# Patient Record
Sex: MALE | Race: WHITE | NOT HISPANIC OR LATINO | Employment: OTHER | URBAN - METROPOLITAN AREA
[De-identification: names, ages, dates, MRNs, and addresses within clinical notes are randomized per-mention and may not be internally consistent; named-entity substitution may affect disease eponyms.]

---

## 2018-03-28 RX ORDER — ATORVASTATIN CALCIUM 10 MG/1
10 TABLET, FILM COATED ORAL DAILY
COMMUNITY
End: 2018-11-15

## 2018-03-28 RX ORDER — ASPIRIN 81 MG/1
81 TABLET ORAL DAILY
COMMUNITY
End: 2018-11-15

## 2018-03-28 RX ORDER — ATORVASTATIN CALCIUM 40 MG/1
40 TABLET, FILM COATED ORAL DAILY
COMMUNITY
End: 2019-09-09

## 2018-03-29 ENCOUNTER — OFFICE VISIT (OUTPATIENT)
Dept: NEUROSURGERY | Facility: CLINIC | Age: 68
End: 2018-03-29

## 2018-03-29 VITALS
WEIGHT: 205 LBS | HEIGHT: 68 IN | TEMPERATURE: 98 F | BODY MASS INDEX: 31.07 KG/M2 | DIASTOLIC BLOOD PRESSURE: 68 MMHG | SYSTOLIC BLOOD PRESSURE: 110 MMHG

## 2018-03-29 DIAGNOSIS — M48.02 SPINAL STENOSIS IN CERVICAL REGION: ICD-10-CM

## 2018-03-29 DIAGNOSIS — M62.59 ATROPHY OF MUSCLE OF MULTIPLE SITES: Primary | ICD-10-CM

## 2018-03-29 DIAGNOSIS — M47.812 CERVICAL SPONDYLOSIS WITHOUT MYELOPATHY: ICD-10-CM

## 2018-03-29 PROCEDURE — 99204 OFFICE O/P NEW MOD 45 MIN: CPT | Performed by: NEUROLOGICAL SURGERY

## 2018-03-29 RX ORDER — LOSARTAN POTASSIUM AND HYDROCHLOROTHIAZIDE 12.5; 5 MG/1; MG/1
1 TABLET ORAL DAILY
Refills: 5 | COMMUNITY
Start: 2018-03-05
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
## 2018-04-16 ENCOUNTER — OFFICE VISIT (OUTPATIENT)
Dept: NEUROSURGERY | Facility: CLINIC | Age: 68
End: 2018-04-16

## 2018-04-16 VITALS
WEIGHT: 199 LBS | OXYGEN SATURATION: 93 % | HEIGHT: 68 IN | SYSTOLIC BLOOD PRESSURE: 130 MMHG | HEART RATE: 66 BPM | TEMPERATURE: 97.3 F | BODY MASS INDEX: 30.16 KG/M2 | DIASTOLIC BLOOD PRESSURE: 60 MMHG

## 2018-04-16 DIAGNOSIS — M62.59 ATROPHY OF MUSCLE OF MULTIPLE SITES: Primary | ICD-10-CM

## 2018-04-16 DIAGNOSIS — M47.812 CERVICAL SPONDYLOSIS WITHOUT MYELOPATHY: ICD-10-CM

## 2018-04-16 DIAGNOSIS — M48.02 SPINAL STENOSIS IN CERVICAL REGION: ICD-10-CM

## 2018-04-16 PROCEDURE — 99213 OFFICE O/P EST LOW 20 MIN: CPT | Performed by: NEUROLOGICAL SURGERY

## 2018-04-16 NOTE — PROGRESS NOTES
Patient: Yosvany Ojeda  :  1950  Chart #:  [id number]    Date of Service: 18    Chief Complaint:  Hand atrophy    Neck Pain    Chronicity: Mr. Ojeda returns today for a follow-up on his neck pain. Episode onset: Patient states that it is not so much pain as it is stiffness.  The stiffness is right sided. The problem occurs intermittently. The problem has been gradually improving (He doesn't have much in the way of arm of hand pain.). The pain is associated with an unknown factor. Pain location: tightness in neck more on left. The quality of the pain is described as aching (occasional neck pain;  no arm pain). The pain is at a severity of 4/10. The pain is mild (He is concerned about the muscle tone in his right upper extremity.). The symptoms are aggravated by position. Stiffness is present in the morning and all day. Associated symptoms include weakness. Pertinent negatives include no numbness or tingling. Associated symptoms comments: No numbness in hands;  Arms and hands atrophied.  . He has tried nothing for the symptoms. The treatment provided no relief.     There are no other new symptoms or complaints since visit on 3/29/18.    Radiographic Images:   EMG/NCV dated 01/15/18 shows he has denervation changes in the C7 and C8 innervated muscles bilaterally.  NCV study of the arms was normal.       MRI C-spine dated shows straightening of the cervical curvature, desiccation of all the discs, general narrowing in the spinal canal and mild compression of the spinal cord at C4/5 with a mild signal change at that level.    Past Medical History:   Diagnosis Date   • Chronic headaches    • Gait disturbance    • Sleep apnea      Current Outpatient Prescriptions   Medication Sig Dispense Refill   • aspirin 81 MG EC tablet Take 81 mg by mouth Daily.     • atorvastatin (LIPITOR) 10 MG tablet Take 10 mg by mouth Daily.     • atorvastatin (LIPITOR) 40 MG tablet Take 40 mg by mouth Daily.     •  "losartan-hydrochlorothiazide (HYZAAR) 50-12.5 MG per tablet Take 1 tablet by mouth Daily.  5     No current facility-administered medications for this visit.       Allergies   Allergen Reactions   • Shellfish-Derived Products Hives     Social History     Social History   • Marital status:      Social History Main Topics   • Smoking status: Current Every Day Smoker   • Smokeless tobacco: Never Used   • Alcohol use Yes   • Drug use: Unknown   • Sexual activity: Defer     Other Topics Concern   • Not on file     Family History   Problem Relation Age of Onset   • Parkinsonism Father    • Diabetes Maternal Grandfather      History reviewed. No pertinent surgical history.  Review of Systems   Musculoskeletal: Positive for arthralgias, gait problem, myalgias, neck pain and neck stiffness.   Neurological: Positive for weakness. Negative for tingling and numbness.   Psychiatric/Behavioral: Positive for agitation.   All other systems reviewed and are negative.    Vitals:    04/16/18 0910   BP: 130/60   BP Location: Left arm   Patient Position: Sitting   Pulse: 66   Temp: 97.3 °F (36.3 °C)   TempSrc: Temporal Artery    SpO2: 93%   Weight: 90.3 kg (199 lb)   Height: 172.7 cm (67.99\")     Physical Exam  Neurologic Exam  Constitutional: The patient is oriented to person, place, and time.  The patient appears well-developed and well-nourished and in no distress.   Neat elderly  o/w healthy male   Neck: Trachea normal and diminished range of motion in all directions. No thyroid mass present. No Spurling's or L'Hermitte's signs  Shoulder ROM limited to 150 deg abduction.  Cardiovascular: Regular rhythm   Pulses:  Carotid pulses are 2+ on the right side, and 2+ on the left side.  Radial pulses are 2+ on the right side, and 2+ on the left side.   Dorsalis pedis pulses are 2+ on the right side, and 2+ on the left side.   Pulmonary/Chest: Effort normal    Musculoskeletal:   Lumbar back: The patient exhibits no pain with movement. " The patient exhibits normal range of motion, no deformity and no spasm.   Mild stiffness; SLR negative; Hip ROM normal        Neurologic Exam      Mental Status   Oriented to person, place, and time.   Attention: normal. Concentration: normal.   Speech: speech is normal   Level of consciousness: alert  Knowledge: good and consistent with education.   Normal comprehension.      Cranial Nerves   Cranial nerves II through XII intact.      Motor Exam   Muscle bulk: normal except atrophy in both arms and hands;  Severe atrophy noted in both hands;  Left arm circumference is 2.5 cm smaller than R.  Overall muscle tone: normal  No Pronator Drift     Strength   Strength 5/5 throughout except 4/5 intrinsic hand muscles and 4+/5 arm muscles.     Sensory Exam   Light touch normal.   Proprioception normal.      Gait, Coordination, and Reflexes      Gait: normal with out spasticity     Tremor   Resting tremor: absent  Intention tremor: absent  Action tremor: absent     Reflexes   Right biceps: 1+  Left biceps: 1+  Right triceps: 1+  Left triceps: 1+  Right patellar: 1+  Left patellar: 1+  Right achilles: 0+  Left achilles: 1+  No Babinski signs  Right Pro: absent  Left Pro: absent  Right ankle clonus: absent  Left ankle clonus: absent  FINN normal; R handed      Diagnoses and all orders for this visit:    Atrophy of muscle of multiple sites  -     Case Request Cath Lab: [facility]    Cervical spondylosis without myelopathy  -     Case Request Cath Lab: [facility]    Spinal stenosis in cervical region  -     Case Request Cath Lab: [facility]      Plan:  I recommend a cervical myelogram with CT to follow;  I will see him again after the myelogram and make further treatment recommendations.  I have discussed this with the patient.    I, Dr. Dionisio Hopson, personally performed the services described in the documentation as scribed in my presence, and the documentation is both accurate and complete.    Dionisio ALBARRAN  MD Mark Atnhony

## 2018-04-24 PROBLEM — M48.02 SPINAL STENOSIS IN CERVICAL REGION: Status: ACTIVE | Noted: 2018-04-24

## 2018-04-24 PROBLEM — M47.812 CERVICAL SPONDYLOSIS WITHOUT MYELOPATHY: Status: ACTIVE | Noted: 2018-04-24

## 2018-04-24 PROBLEM — M62.59 ATROPHY OF MUSCLE OF MULTIPLE SITES: Status: ACTIVE | Noted: 2018-04-24

## 2018-04-27 ENCOUNTER — APPOINTMENT (OUTPATIENT)
Dept: CT IMAGING | Facility: HOSPITAL | Age: 68
End: 2018-04-27

## 2018-04-27 ENCOUNTER — HOSPITAL ENCOUNTER (OUTPATIENT)
Facility: HOSPITAL | Age: 68
Discharge: HOME | End: 2018-04-27
Attending: RADIOLOGY | Admitting: RADIOLOGY

## 2018-04-27 VITALS
OXYGEN SATURATION: 96 % | RESPIRATION RATE: 16 BRPM | HEART RATE: 60 BPM | DIASTOLIC BLOOD PRESSURE: 81 MMHG | BODY MASS INDEX: 29.7 KG/M2 | WEIGHT: 195.99 LBS | TEMPERATURE: 97.7 F | SYSTOLIC BLOOD PRESSURE: 122 MMHG | HEIGHT: 68 IN

## 2018-04-27 DIAGNOSIS — M48.02 SPINAL STENOSIS IN CERVICAL REGION: ICD-10-CM

## 2018-04-27 DIAGNOSIS — M47.812 CERVICAL SPONDYLOSIS WITHOUT MYELOPATHY: ICD-10-CM

## 2018-04-27 DIAGNOSIS — M62.59 ATROPHY OF MUSCLE OF MULTIPLE SITES: ICD-10-CM

## 2018-04-27 PROCEDURE — 62302 MYELOGRAPHY LUMBAR INJECTION: CPT | Performed by: RADIOLOGY

## 2018-04-27 PROCEDURE — 25010000002 IOPAMIDOL 61 % SOLUTION: Performed by: RADIOLOGY

## 2018-04-27 PROCEDURE — 72126 CT NECK SPINE W/DYE: CPT

## 2018-04-27 RX ORDER — LIDOCAINE HYDROCHLORIDE 10 MG/ML
INJECTION, SOLUTION EPIDURAL; INFILTRATION; INTRACAUDAL; PERINEURAL AS NEEDED
Status: DISCONTINUED | OUTPATIENT
Start: 2018-04-27 | End: 2018-04-27 | Stop reason: HOSPADM

## 2018-05-31 ENCOUNTER — OFFICE VISIT (OUTPATIENT)
Dept: NEUROSURGERY | Facility: CLINIC | Age: 68
End: 2018-05-31

## 2018-05-31 VITALS
WEIGHT: 198.6 LBS | SYSTOLIC BLOOD PRESSURE: 124 MMHG | DIASTOLIC BLOOD PRESSURE: 78 MMHG | BODY MASS INDEX: 30.1 KG/M2 | OXYGEN SATURATION: 95 % | HEART RATE: 60 BPM | HEIGHT: 68 IN | TEMPERATURE: 98 F

## 2018-05-31 DIAGNOSIS — M62.59 ATROPHY OF MUSCLE OF MULTIPLE SITES: Primary | ICD-10-CM

## 2018-05-31 DIAGNOSIS — M48.02 SPINAL STENOSIS IN CERVICAL REGION: ICD-10-CM

## 2018-05-31 DIAGNOSIS — M47.812 CERVICAL SPONDYLOSIS WITHOUT MYELOPATHY: ICD-10-CM

## 2018-05-31 PROCEDURE — 99213 OFFICE O/P EST LOW 20 MIN: CPT | Performed by: NEUROLOGICAL SURGERY

## 2018-05-31 NOTE — PROGRESS NOTES
Patient: Yosvany Ojeda  :  1950  Chart #:  [id number]    Date of Service: 18    Chief Complaint:   Chief Complaint   Patient presents with   • Neck Pain   Arm and hand atrophy    Neck Pain    Chronicity: Mr. Ojeda returns today for a follow-up on his cervical pain. Episode onset: He has complaints of stiffness on the right side. Episode frequency: He complains of atrophy in his hands bilaterally. The problem has been unchanged. The pain is associated with an unknown factor. The pain is present in the right side. The quality of the pain is described as burning. The pain is at a severity of 4/10. The pain is mild (His pain is mild to moderate.). The symptoms are aggravated by position. The pain is same all the time. Stiffness is present in the morning. Associated symptoms include weakness. Associated symptoms comments: He has intermittent muscle tightness in left trapezius.  He has never had spine surgery.  He has not noted worsening strength in his hands.  He has old numbness in median nerve distribution of left hand but no other sensory changes in either arm or hand.  He has no arm pain.  . He has tried heat for the symptoms. The treatment provided mild relief.     Radiographic Images:   CT of the cervical spine with intrathecal contrast dated 18 shows he has a spontaneous fusion at C6-C7 vertebral bodies.  His spine otherwise looks good.  There is no spinal stenosis;  There is no foraminal stenosis at C7T1 or [id number] on the right or left.  There is no spinal cord compression.      Past Medical History:   Diagnosis Date   • Chronic headaches    • Gait disturbance    • Hyperlipidemia    • Hypertension    • Sleep apnea      Current Outpatient Prescriptions   Medication Sig Dispense Refill   • aspirin 81 MG EC tablet Take 81 mg by mouth Daily.     • atorvastatin (LIPITOR) 10 MG tablet Take 10 mg by mouth Daily.     • atorvastatin (LIPITOR) 40 MG tablet Take 40 mg by mouth Daily.     •  "losartan-hydrochlorothiazide (HYZAAR) 50-12.5 MG per tablet Take 1 tablet by mouth Daily.  5     No current facility-administered medications for this visit.       Allergies   Allergen Reactions   • Shellfish-Derived Products Hives     Social History     Social History   • Marital status:      Social History Main Topics   • Smoking status: Current Every Day Smoker     Packs/day: 1.00     Types: Cigarettes   • Smokeless tobacco: Never Used   • Alcohol use No   • Drug use: Unknown   • Sexual activity: Defer     Other Topics Concern   • Not on file     Family History   Problem Relation Age of Onset   • Parkinsonism Father    • Diabetes Maternal Grandfather      Past Surgical History:   Procedure Laterality Date   • CARDIAC CATHETERIZATION      40% blockage   • INTERVENTIONAL RADIOLOGY PROCEDURE N/A 4/27/2018    Procedure: [facility];  Surgeon: Dion Ghotra MD;  Location: [facility] CATH INVASIVE LOCATION;  Service: Interventional Radiology     Review of Systems   Endocrine: Positive for heat intolerance.   Musculoskeletal: Positive for myalgias, neck pain and neck stiffness.   Neurological: Positive for weakness and light-headedness.   All other systems reviewed and are negative.    Vitals:    05/31/18 0846   BP: 124/78   BP Location: Right arm   Patient Position: Sitting   Pulse: 60   Temp: 98 °F (36.7 °C)   TempSrc: Temporal Artery    SpO2: 95%   Weight: 90.1 kg (198 lb 9.6 oz)   Height: 172.7 cm (67.99\")     Physical Exam  Neurologic Exam  Constitutional: The patient is oriented to person, place, and time.  The patient appears well-developed and well-nourished and in no distress.   Neat elderly  o/w healthy male   Neck: Trachea normal and diminished range of motion in all directions. No thyroid mass present. No Spurling's or L'Hermitte's signs  Shoulder ROM limited to 150 deg abduction.  Cardiovascular: Regular rhythm   Pulses:  Carotid pulses are 2+ on the right side, and 2+ on the left side.  Radial " pulses are 2+ on the right side, and 2+ on the left side.   Dorsalis pedis pulses are 2+ on the right side, and 2+ on the left side.   Pulmonary/Chest: Effort normal    Musculoskeletal:   Lumbar back: The patient exhibits no pain with movement. The patient exhibits normal range of motion, no deformity and no spasm.   Mild stiffness; SLR negative; Hip ROM normal        Neurologic Exam      Mental Status   Oriented to person, place, and time.   Attention: normal. Concentration: normal.   Speech: speech is normal   Level of consciousness: alert  Knowledge: good and consistent with education.   Normal comprehension.      Cranial Nerves   Cranial nerves II through XII intact.      Motor Exam   Muscle bulk: normal except atrophy in both arms and hands;  Severe atrophy noted in both hands;  Left arm circumference is 2.5 cm smaller than R.  Overall muscle tone: normal  No Pronator Drift     Strength   Strength 5/5 throughout except 4/5 intrinsic hand muscles and 4+/5 arm muscles.     Sensory Exam   Light touch normal.   Proprioception normal.      Gait, Coordination, and Reflexes      Gait: normal with out spasticity     Tremor   Resting tremor: absent  Intention tremor: absent  Action tremor: absent     Reflexes   Right biceps: 1+  Left biceps: 1+  Right triceps: 1+  Left triceps: 1+  Right patellar: 1+  Left patellar: 1+  Right achilles: 0+  Left achilles: 1+  No Babinski signs  Right Pro: absent  Left Pro: absent  Right ankle clonus: absent  Left ankle clonus: absent  FINN normal; R handed      Yosvany was seen today for neck pain.    Diagnoses and all orders for this visit:    Atrophy of muscle of multiple sites    Cervical spondylosis without myelopathy    Spinal stenosis in cervical region    Probable motor peripheral neuropathy    Plan:  I don't recommend any spine surgery at this time.  I have discussed this with the patient and family.  I will see him again prn if new symptoms develop.      I, Dr. Dionisio ALBARRAN  Mark Anthony, personally performed the services described in the documentation as scribed in my presence, and the documentation is both accurate and complete.    Dionisio Hopson MD

## 2018-11-15 ENCOUNTER — OFFICE VISIT (OUTPATIENT)
Dept: RADIATION ONCOLOGY | Facility: HOSPITAL | Age: 68
End: 2018-11-15

## 2018-11-15 ENCOUNTER — HOSPITAL ENCOUNTER (OUTPATIENT)
Dept: RADIATION ONCOLOGY | Facility: HOSPITAL | Age: 68
Setting detail: RADIATION/ONCOLOGY SERIES
Discharge: HOME | End: 2018-11-15

## 2018-11-15 VITALS
HEART RATE: 67 BPM | SYSTOLIC BLOOD PRESSURE: 142 MMHG | DIASTOLIC BLOOD PRESSURE: 84 MMHG | OXYGEN SATURATION: 99 % | TEMPERATURE: 98.4 F | BODY MASS INDEX: 31.21 KG/M2 | WEIGHT: 205.2 LBS | RESPIRATION RATE: 20 BRPM

## 2018-11-15 DIAGNOSIS — C61 PROSTATE CANCER (HCC): Primary | ICD-10-CM

## 2018-11-15 PROCEDURE — G0463 HOSPITAL OUTPT CLINIC VISIT: HCPCS

## 2018-11-15 RX ORDER — HYDROCHLOROTHIAZIDE 12.5 MG/1
12.5 TABLET ORAL DAILY
COMMUNITY
End: 2019-02-01

## 2018-11-15 RX ORDER — TAMSULOSIN HYDROCHLORIDE 0.4 MG/1
CAPSULE ORAL
Refills: 3 | COMMUNITY
Start: 2018-08-23 | End: 2019-09-09

## 2018-11-15 RX ORDER — SILDENAFIL CITRATE 20 MG/1
20-100 TABLET ORAL DAILY PRN
Qty: 30 TABLET | Refills: 5 | Status: SHIPPED | OUTPATIENT
Start: 2018-11-15 | End: 2020-02-04

## 2018-11-15 NOTE — PROGRESS NOTES
CONSULTATION NOTE      :                                                          1950  DATE OF CONSULTATION:                       11/15/2018   REQUESTING PHYSICIAN:                   Charbel Woo MD  REASON FOR CONSULTATION:       Prostate cancer (CMS/[facility])  Staging form: Prostate, AJCC 8th Edition  - Clinical stage from 10/1/2018: Stage IIB (cT1c, cN0, cM0, PSA: 7.7, Grade Group: 2) - Signed by Atul Hartley MD on 11/15/2018       BRIEF HISTORY:  The patient is a very pleasant 68 y.o. male  with recently diagnosed prostate cancer.  He presented with elevated PSA of 7.7 ng/ml on 2018.  PSA was 9.6 ng/ml on 2018.  Repeat PSA was 7.71 ng/ml on 2018.  ROSA revealed 1+ enlarged prostate gland with no suspicious findings; however, biopsy was performed 10/1/2018 due to persistently elevated PSA.   Adenocarcinoma, [place] score 3+3 = 6 was identified in one out of 4 cores from the right lobe.  Adenocarcinoma, Benigno score 3+4= 7 was identified in one out of 4 cores from the left lobe.  Tumor occupied 5% of submitted tissue.  There is presence of perineural invasion.  There was no evidence of lymphovascular invasion or extraprostatic extension.  Nuclear medicine bone scan showed no evidence of bony metastasis.  CT scans abdomen and pelvis showed no evidence of extraprostatic spread or pathologic lymphadenopathy.    Allergies   Allergen Reactions   • Shellfish-Derived Products Hives       Social History     Socioeconomic History   • Marital status:      Spouse name: Not on file   • Number of children: Not on file   • Years of education: Not on file   • Highest education level: Not on file   Tobacco Use   • Smoking status: Current Every Day Smoker     Packs/day: 1.00     Years: 15.00     Pack years: 15.00     Types: Cigarettes   • Smokeless tobacco: Former User   Substance and Sexual Activity   • Alcohol use: No   • Drug use: No       Past Medical History:   Diagnosis Date   •  Chronic headaches    • Gait disturbance    • Hyperlipidemia    • Hypertension    • Prostate cancer (CMS/HCC)    • Sleep apnea    • Stroke (CMS/HCC)     brain bleed from trauma       family history includes Aneurysm in his mother; Diabetes in his maternal grandfather; Parkinsonism in his father.     Past Surgical History:   Procedure Laterality Date   • CARDIAC CATHETERIZATION      40% blockage   • CARDIAC CATHETERIZATION  2017   • PROSTATE BIOPSY  2018        Review of Systems   Constitutional: Positive for fatigue.   HENT:   Positive for hearing loss and tinnitus.    Eyes: Negative.    Respiratory: Negative.    Cardiovascular: Negative.    Gastrointestinal: Negative.    Endocrine: Negative.    Genitourinary: Negative.     Skin: Negative.    Neurological: Positive for extremity weakness (left arm cold and tired).   Hematological: Negative.    Psychiatric/Behavioral: Negative.              IPSS Questionnaire (AUA-7):  Over the past month…    1)  Incomplete Emptying  How often have you had a sensation of not emptying your bladder?  2 - Less than half the time   2)  Frequency  How often have you had to urinate less than every two hours? 2 - Less than half the time   3)  Intermittency  How often have you found you stopped and started again several times when you urinated?  2 - Less than half the time   4) Urgency  How often have you found it difficult to postpone urination?  2 - Less than half the time   5) Weak Stream  How often have you had a weak urinary stream?  3 - About half the time   6) Straining  How often have you had to push or strain to begin urination?  3 - About half the time   7) Nocturia  How many times did you typically get up at night to urinate?  2 - 2 times   Total Score:  16       Quality of life due to urinary symptoms:  If you were to spend the rest of your life with your urinary condition the way it is now, how would you feel about that? 3-Mixed   Urine Leakage (Incontinence) 0-No Leakage      Sexual Health Inventory  Current Status    1)  How do you rate your confidence that you could achieve and keep an erection? 1-Very Low   2) When you had erections with sexual stimulation, how often were your erections hard enough for penetration (entering your partner)? 0-No sexual activity   3)  During sexual intercourse, how often were you able to maintain your erection after you had penetrated (entered) into your partner? 0-Did not attempt intercourse   4) During sexual intercourse, how difficult was it to maintain your erection to completion of intercourse? 0-Did not attempt intercourse   5) When you attempted sexual intercourse, how often was it satisfactory to you? 0-No sexual activity   Total Score: 1       Bowel Health Inventory  Current Status: 0-No problems, no rectal bleeding, no discharge, less then 5 bowel movements a day         Objective   VITAL SIGNS:   Vitals:    11/15/18 1315   BP: 142/84   Pulse: 67   Resp: 20   Temp: 98.4 °F (36.9 °C)   TempSrc: Temporal   SpO2: 99%   Weight: 93.1 kg (205 lb 3.2 oz)   PainSc: 0-No pain        Karnofsky score: 90        Physical Exam   Constitutional: He is oriented to person, place, and time. He appears well-developed and well-nourished.   HENT:   Head: Normocephalic and atraumatic.   Neck: Normal range of motion. Neck supple.   Cardiovascular: Normal rate, regular rhythm and normal heart sounds.   No murmur heard.  Pulmonary/Chest: Effort normal and breath sounds normal. He has no wheezes. He has no rales.   Abdominal: Soft. Bowel sounds are normal. He exhibits no distension. There is no hepatosplenomegaly. There is no tenderness.   Genitourinary: Rectal exam shows no external hemorrhoid, no mass and no tenderness. Prostate is enlarged (40 cc, symmetric, no nodules or induration.  Seminal vesicles not palpable.). Prostate is not tender.   Musculoskeletal: Normal range of motion. He exhibits no edema or tenderness.   Lymphadenopathy:     He has no cervical  adenopathy.     He has no axillary adenopathy.        Right: No supraclavicular adenopathy present.        Left: No supraclavicular adenopathy present.   Neurological: He is alert and oriented to person, place, and time. He has normal strength. No sensory deficit.   Skin: Skin is warm and dry.   Psychiatric: He has a normal mood and affect. His behavior is normal. Judgment and thought content normal.   Nursing note and vitals reviewed.           The following portions of the patient's history were reviewed and updated as appropriate: allergies, current medications, past family history, past medical history, past social history, past surgical history and problem list.    Assessment      Prostate cancer, Benigno score 3+4 = 7, clinical stage IIB (T1c, N0, M0) with PSA 7.71 ng/ml.  He has favorable intermediate risk disease.  He is a candidate for definitive treatment and is inclined to proceed with treatment rather than active surveillance.  We reviewed the radiation treatment options of IMRT, SBRT and brachytherapy.  He would like to proceed with stereotactic body radiotherapy.  The CyberKnife treatment procedures been reviewed in detail today.  Informed consent obtained.    RECOMMENDATIONS:  He will return to Dr. Woo for placement of gold seed fiducials.  Screening colonoscopy will be performed initially since he has never had that procedure performed.  He would like that to be done closer to home in [place], [place].  The prostate gland and proximal seminal vesicles will receive 5 fractions of 7 Gray each on the CyberKnife.  Smoking cessation was strongly encouraged.    Return in about 4 weeks (around 12/13/2018) for Office Visit, Simulation, CyberKnife treatment.  Yosvany was seen today for prostate cancer.    Diagnoses and all orders for this visit:    Prostate cancer (CMS/[facility])    Other orders  -     sildenafil (REVATIO) 20 MG tablet; Take 1-5 tablets by mouth Daily As Needed as directed         Atul MARES  MD [place]

## 2018-12-21 ENCOUNTER — ON CAMPUS - OUTPATIENT (OUTPATIENT)
Dept: RURAL HOSPITAL 6 | Facility: HOSPITAL | Age: 68
End: 2018-12-21

## 2018-12-21 DIAGNOSIS — K57.90 DIVERTICULOSIS OF INTESTINE, PART UNSPECIFIED, WITHOUT PERFO: ICD-10-CM

## 2018-12-21 DIAGNOSIS — K64.0 FIRST DEGREE HEMORRHOIDS: ICD-10-CM

## 2018-12-21 DIAGNOSIS — D12.3 BENIGN NEOPLASM OF TRANSVERSE COLON: ICD-10-CM

## 2018-12-21 DIAGNOSIS — K63.89 OTHER SPECIFIED DISEASES OF INTESTINE: ICD-10-CM

## 2018-12-21 DIAGNOSIS — D12.2 BENIGN NEOPLASM OF ASCENDING COLON: ICD-10-CM

## 2018-12-21 DIAGNOSIS — Z12.11 ENCOUNTER FOR SCREENING FOR MALIGNANT NEOPLASM OF COLON: ICD-10-CM

## 2018-12-21 DIAGNOSIS — D12.5 BENIGN NEOPLASM OF SIGMOID COLON: ICD-10-CM

## 2018-12-21 PROCEDURE — 45385 COLONOSCOPY W/LESION REMOVAL: CPT | Mod: PT | Performed by: INTERNAL MEDICINE

## 2019-01-02 ENCOUNTER — TELEPHONE (OUTPATIENT)
Dept: RADIATION ONCOLOGY | Facility: HOSPITAL | Age: 69
End: 2019-01-02

## 2019-01-02 DIAGNOSIS — C61 PROSTATE CANCER (HCC): Primary | ICD-10-CM

## 2019-01-02 NOTE — TELEPHONE ENCOUNTER
Pt's wife called to let me know pt had his colonoscopy done by .   Called pt's wife back with the following appts:  [facility] with Dr. Woo on 1/25/19 @ 0900  On 2/1/19 clinic with Dr. Hartley @ 0800  @ 0830 CK sim  @ 0900 CT sim  @ 0930 MRI for 1000 scan  Educated wife on the importance of following the gas reducing diet starting 1/30/19 with gas-x 4 times per day, after each meal and at bedtime.  The morning of 2/1/19 nothing to eat and pt will need to perform an enema prior to leaving home.   Pt's wife verbalized understanding.     Message sent to Ivelisse Christensen RD.

## 2019-01-24 ENCOUNTER — TELEPHONE (OUTPATIENT)
Dept: NUTRITION | Facility: HOSPITAL | Age: 69
End: 2019-01-24

## 2019-02-01 ENCOUNTER — OFFICE VISIT (OUTPATIENT)
Dept: RADIATION ONCOLOGY | Facility: HOSPITAL | Age: 69
End: 2019-02-01

## 2019-02-01 ENCOUNTER — HOSPITAL ENCOUNTER (OUTPATIENT)
Dept: MRI IMAGING | Facility: HOSPITAL | Age: 69
Discharge: HOME | End: 2019-02-01
Attending: RADIOLOGY | Admitting: RADIOLOGY

## 2019-02-01 ENCOUNTER — HOSPITAL ENCOUNTER (OUTPATIENT)
Dept: RADIATION ONCOLOGY | Facility: HOSPITAL | Age: 69
Setting detail: RADIATION/ONCOLOGY SERIES
Discharge: HOME | End: 2019-02-01

## 2019-02-01 VITALS
SYSTOLIC BLOOD PRESSURE: 123 MMHG | DIASTOLIC BLOOD PRESSURE: 66 MMHG | TEMPERATURE: 97.6 F | OXYGEN SATURATION: 98 % | RESPIRATION RATE: 20 BRPM | HEART RATE: 56 BPM

## 2019-02-01 DIAGNOSIS — C61 PROSTATE CANCER (HCC): ICD-10-CM

## 2019-02-01 DIAGNOSIS — C61 PROSTATE CANCER (HCC): Primary | ICD-10-CM

## 2019-02-01 PROCEDURE — 77290 THER RAD SIMULAJ FIELD CPLX: CPT | Performed by: RADIOLOGY

## 2019-02-01 PROCEDURE — G0463 HOSPITAL OUTPT CLINIC VISIT: HCPCS

## 2019-02-01 PROCEDURE — 72195 MRI PELVIS W/O DYE: CPT

## 2019-02-01 NOTE — PROGRESS NOTES
RE-EVALUATION    PATIENT:                                                      Yosvany Ojeda  :                                                          1950  DATE:                          2019   DIAGNOSIS:     Prostate Cancer  Cancer Staging  Stage IIB (cT1c, cN0, cM0, PSA: 7.7, Grade Group: 2)       BRIEF HISTORY:  The patient is a very pleasant 68 y.o. male  with intermediate risk prostate cancer.  He chose to undergo definitive treatment with CyberKnife SBRT.  He experienced dysuria and after placement of gold seed fiducials however bleeding has slowed considerably and almost resolved.  Since he was last here he underwent screening colonoscopy with removal of 6 nonmalignant polyps.  He scheduled for repeat colonoscopy in 3 years.  He has reduced smoking but has not been successful with cessation of smoking.  He is on all prep and diet to begin treatment planning for SBRT.  He's had no other significant change in health since informed consent for SBRT was obtained on 11/15/2018 and remains a good candidate for his chosen procedure.    Allergies   Allergen Reactions   • Shellfish-Derived Products Hives       Review of Systems   Constitutional: Negative.    HENT:   Positive for hearing loss.    Eyes: Negative.    Respiratory: Negative.    Cardiovascular: Negative.    Gastrointestinal: Negative.    Endocrine: Negative.    Genitourinary: Negative.     Musculoskeletal: Negative.    Skin: Negative.    Neurological: Negative.    Hematological: Negative.    Psychiatric/Behavioral: Negative.            IPSS Questionnaire (AUA-7):  Over the past month…     1)  Incomplete Emptying  How often have you had a sensation of not emptying your bladder?  2 - Less than half the time   2)  Frequency  How often have you had to urinate less than every two hours? 2 - Less than half the time   3)  Intermittency  How often have you found you stopped and started again several times when you urinated?  2 - Less than half the  time   4) Urgency  How often have you found it difficult to postpone urination?  2 - Less than half the time   5) Weak Stream  How often have you had a weak urinary stream?  3 - About half the time   6) Straining  How often have you had to push or strain to begin urination?  3 - About half the time   7) Nocturia  How many times did you typically get up at night to urinate?  2 - 2 times   Total Score:  16         Quality of life due to urinary symptoms:  If you were to spend the rest of your life with your urinary condition the way it is now, how would you feel about that? 3-Mixed   Urine Leakage (Incontinence) 0-No Leakage      Sexual Health Inventory  Current Status     1)  How do you rate your confidence that you could achieve and keep an erection? 1-Very Low   2) When you had erections with sexual stimulation, how often were your erections hard enough for penetration (entering your partner)? 0-No sexual activity   3)  During sexual intercourse, how often were you able to maintain your erection after you had penetrated (entered) into your partner? 0-Did not attempt intercourse   4) During sexual intercourse, how difficult was it to maintain your erection to completion of intercourse? 0-Did not attempt intercourse   5) When you attempted sexual intercourse, how often was it satisfactory to you? 0-No sexual activity   Total Score: 1         Bowel Health Inventory  Current Status: 0-No problems, no rectal bleeding, no discharge, less then 5 bowel movements a day                     Objective   VITAL SIGNS:   Vitals:    02/01/19 0810   BP: 123/66   Pulse: 56   Resp: 20   Temp: 97.6 °F (36.4 °C)   TempSrc: Temporal   SpO2: 98%   PainSc: 0-No pain        KPS 80%    Physical Exam         The following portions of the patient's history were reviewed and updated as appropriate: allergies, current medications, past family history, past medical history, past social history, past surgical history and problem  list.    Diagnoses and all orders for this visit:    Prostate cancer (CMS/[facility])      IMPRESSION:  Prostate cancer, [place] score 3+4 = 7, clinical stage IIB (T1c, N0, M0) with PSA 7.71 ng/ml.    RECOMMENDATIONS:  Treatment planning CT and MRI pelvis today.  Prostate gland and proximal seminal vesicles will receive 5 fractions of 7 Gray each on the CyberKnife.       Atul Hartley MD

## 2019-02-01 NOTE — PROGRESS NOTES
FOLLOW UP NOTE    PATIENT:                                                      Yosvany Ojeda  MEDICAL RECORD #:                        [id number]  :                                                          1950  COMPLETION DATE:   ***  DIAGNOSIS:     Cancer Staging  Prostate cancer (CMS/[facility])  Staging form: Prostate, AJCC 8th Edition  - Clinical stage from 10/1/2018: Stage IIB (cT1c, cN0, cM0, PSA: 7.7, Grade Group: 2) - Signed by Atul Hartley MD on 11/15/2018        BRIEF HISTORY:    ***    MEDICATIONS: Medication reconciliation for the patient was reviewed and confirmed in the electronic medical record.    Review of Systems - Oncology    KPS ***%    Physical Exam    VITAL SIGNS:   Vitals:    19 0810   BP: 123/66   Pulse: 56   Resp: 20   Temp: 97.6 °F (36.4 °C)   TempSrc: Temporal   SpO2: 98%   PainSc: 0-No pain       The following portions of the patient's history were reviewed and updated as appropriate: allergies, current medications, past family history, past medical history, past social history, past surgical history and problem list.         There are no diagnoses linked to this encounter.     IMPRESSION:  ***    RECOMMENDATIONS:  ***    No Follow-up on file.    Jennifer Rai RN    Errors in dictation may reflect use of voice recognition software and not all errors in transcription may have been detected prior to signing.

## 2019-02-04 ENCOUNTER — APPOINTMENT (OUTPATIENT)
Dept: RADIATION ONCOLOGY | Facility: HOSPITAL | Age: 69
End: 2019-02-04

## 2019-02-11 PROCEDURE — 77334 RADIATION TREATMENT AID(S): CPT | Performed by: RADIOLOGY

## 2019-02-11 PROCEDURE — 77300 RADIATION THERAPY DOSE PLAN: CPT | Performed by: RADIOLOGY

## 2019-02-11 PROCEDURE — 77295 3-D RADIOTHERAPY PLAN: CPT | Performed by: RADIOLOGY

## 2019-02-18 ENCOUNTER — HOSPITAL ENCOUNTER (OUTPATIENT)
Dept: RADIATION ONCOLOGY | Facility: HOSPITAL | Age: 69
Discharge: HOME | End: 2019-02-18

## 2019-02-18 PROCEDURE — 77290 THER RAD SIMULAJ FIELD CPLX: CPT | Performed by: RADIOLOGY

## 2019-02-18 PROCEDURE — 77373 STRTCTC BDY RAD THER TX DLVR: CPT | Performed by: RADIOLOGY

## 2019-02-18 PROCEDURE — 77280 THER RAD SIMULAJ FIELD SMPL: CPT | Performed by: RADIOLOGY

## 2019-02-19 ENCOUNTER — HOSPITAL ENCOUNTER (OUTPATIENT)
Dept: RADIATION ONCOLOGY | Facility: HOSPITAL | Age: 69
Discharge: HOME | End: 2019-02-19

## 2019-02-19 PROCEDURE — 77373 STRTCTC BDY RAD THER TX DLVR: CPT | Performed by: RADIOLOGY

## 2019-02-20 ENCOUNTER — HOSPITAL ENCOUNTER (OUTPATIENT)
Dept: RADIATION ONCOLOGY | Facility: HOSPITAL | Age: 69
Discharge: HOME | End: 2019-02-20

## 2019-02-20 PROCEDURE — 77373 STRTCTC BDY RAD THER TX DLVR: CPT | Performed by: RADIOLOGY

## 2019-02-21 ENCOUNTER — HOSPITAL ENCOUNTER (OUTPATIENT)
Dept: RADIATION ONCOLOGY | Facility: HOSPITAL | Age: 69
Discharge: HOME | End: 2019-02-21

## 2019-02-21 PROCEDURE — 77373 STRTCTC BDY RAD THER TX DLVR: CPT | Performed by: RADIOLOGY

## 2019-02-22 ENCOUNTER — HOSPITAL ENCOUNTER (OUTPATIENT)
Dept: RADIATION ONCOLOGY | Facility: HOSPITAL | Age: 69
Discharge: HOME | End: 2019-02-22

## 2019-02-22 PROCEDURE — 77373 STRTCTC BDY RAD THER TX DLVR: CPT | Performed by: RADIOLOGY

## 2019-02-22 PROCEDURE — 77336 RADIATION PHYSICS CONSULT: CPT | Performed by: RADIOLOGY

## 2019-03-05 ENCOUNTER — TELEPHONE (OUTPATIENT)
Dept: RADIATION ONCOLOGY | Facility: HOSPITAL | Age: 69
End: 2019-03-05

## 2019-03-05 NOTE — TELEPHONE ENCOUNTER
Pt's wife called stating pt is having a hard time getting his urine stream started.  She states once its started his stream is good.  She states that he had one drop of blood in his urine this am.  Discussed with Dr. Hartley and called wife back.  Instructed her to have pt take a flomax now and at midnight tonight then tomorrow get him on an 8am-8pm or 9am-9pm schedule. Also instructed that he can take ibuprofen which will help with any inflammation. 400 mg every 6 hours.  Pt's wife verbalized understanding.

## 2019-03-26 ENCOUNTER — TELEPHONE (OUTPATIENT)
Dept: RADIATION ONCOLOGY | Facility: HOSPITAL | Age: 69
End: 2019-03-26

## 2019-03-26 NOTE — TELEPHONE ENCOUNTER
Pt's wife called wanting to know if pt needs PSA for his f/u with Dr. Hartley.   I explained that pt will need a PSA 3 months after ck treatment.   Pt's wife verbalized understanding.

## 2019-04-05 ENCOUNTER — OFFICE VISIT (OUTPATIENT)
Dept: RADIATION ONCOLOGY | Facility: HOSPITAL | Age: 69
End: 2019-04-05

## 2019-04-05 ENCOUNTER — HOSPITAL ENCOUNTER (OUTPATIENT)
Dept: RADIATION ONCOLOGY | Facility: HOSPITAL | Age: 69
Setting detail: RADIATION/ONCOLOGY SERIES
Discharge: HOME | End: 2019-04-05

## 2019-04-05 VITALS
RESPIRATION RATE: 20 BRPM | SYSTOLIC BLOOD PRESSURE: 109 MMHG | HEART RATE: 65 BPM | WEIGHT: 202.8 LBS | TEMPERATURE: 97.2 F | DIASTOLIC BLOOD PRESSURE: 61 MMHG | BODY MASS INDEX: 30.84 KG/M2 | OXYGEN SATURATION: 97 %

## 2019-04-05 DIAGNOSIS — C61 PROSTATE CANCER (HCC): Primary | ICD-10-CM

## 2019-04-05 PROCEDURE — G0463 HOSPITAL OUTPT CLINIC VISIT: HCPCS

## 2019-04-05 NOTE — PROGRESS NOTES
FOLLOW UP NOTE    PATIENT:                                                      Yosvany Ojeda  MEDICAL RECORD #:                        [id number]  :                                                          1950  COMPLETION DATE:   19  DIAGNOSIS:     Prostate cancer (CMS/[facility])  Staging form: Prostate, AJCC 8th Edition  - Clinical stage from 10/1/2018: Stage IIB (cT1c, cN0, cM0, PSA: 7.7, Grade Group: 2) - Signed by Atul Hartley MD on 11/15/2018      BRIEF HISTORY:    Initial follow-up visit after CyberKnife SB RT for intermediate risk prostate cancer.  He experienced brief urinary symptoms of hesitancy and scant hematuria which resolved quickly.  He increased Flomax to twice daily.  He has continued fatigue.  Bowels are normal.  He has not yet had a posttreatment PSA but is scheduled to follow-up with Dr. Woo next month.    MEDICATIONS: Medication reconciliation for the patient was reviewed and confirmed in the electronic medical record.    Review of Systems   Constitutional: Positive for fatigue.   HENT:   Positive for hearing loss.    Eyes: Negative.    Respiratory: Positive for shortness of breath.    Cardiovascular: Negative.    Gastrointestinal: Negative.    Endocrine: Negative.    Genitourinary: Negative.     Musculoskeletal: Negative.    Skin: Negative.    Neurological: Negative.    Hematological: Negative.    Psychiatric/Behavioral: Negative.        KPS 80%    Physical Exam   Constitutional: He is oriented to person, place, and time. He appears well-developed and well-nourished.   HENT:   Head: Normocephalic and atraumatic.   Neck: Normal range of motion. Neck supple.   Cardiovascular: Normal rate, regular rhythm and normal heart sounds.   No murmur heard.  Pulmonary/Chest: Effort normal and breath sounds normal. He has no wheezes. He has no rales.   Abdominal: Soft. Bowel sounds are normal. He exhibits no distension. There is no hepatosplenomegaly. There is no tenderness.    Musculoskeletal: Normal range of motion. He exhibits no edema or tenderness.   Lymphadenopathy:     He has no cervical adenopathy.     He has no axillary adenopathy.        Right: No supraclavicular adenopathy present.        Left: No supraclavicular adenopathy present.   Neurological: He is alert and oriented to person, place, and time. He has normal strength. No sensory deficit.   Skin: Skin is warm and dry.   Psychiatric: He has a normal mood and affect. His behavior is normal. Judgment and thought content normal.   Nursing note and vitals reviewed.      VITAL SIGNS:   Vitals:    04/05/19 0915   BP: 109/61   Pulse: 65   Resp: 20   Temp: 97.2 °F (36.2 °C)   TempSrc: Temporal   SpO2: 97%   Weight: 92 kg (202 lb 12.8 oz)   PainSc: 0-No pain       The following portions of the patient's history were reviewed and updated as appropriate: allergies, current medications, past family history, past medical history, past social history, past surgical history and problem list.         Yosvany was seen today for prostate cancer.    Diagnoses and all orders for this visit:    Prostate cancer (CMS/[facility])         IMPRESSION:  Prostate cancer, [place] score 3+4 = 7, clinical stage IIB (T1c, N0, M0) with PSA 7.71 ng/ml.  He tolerated CyberKnife SBRT very well.    RECOMMENDATIONS: He plans to continue urologic surveillance of the care of Dr. Woo.  He will decrease Flomax use to nightly and then taper/discontinue, as tolerated.    Return in about 6 months (around 10/5/2019) for Office Visit.    Atul Hartley MD    Errors in dictation may reflect use of voice recognition software and not all errors in transcription may have been detected prior to signing.

## 2019-08-06 ENCOUNTER — TELEPHONE (OUTPATIENT)
Dept: RADIATION ONCOLOGY | Facility: HOSPITAL | Age: 69
End: 2019-08-06

## 2019-08-06 NOTE — TELEPHONE ENCOUNTER
Called pt's wife back this am.  She states pt's PSA has increased to 9.64 and Dr. Woo wants to repeat a prostate biopsy.   I asked if he has pain or burning or fullness in his perineum.  Wife states he does not but that [place]  She states she wants Dr. Hartley's opinion before they proceed with biopsy.  I explained I would discuss with Dr. Hartley and I will call her back.  Wife verbalized understanding.

## 2019-08-06 NOTE — TELEPHONE ENCOUNTER
Discussed with Dr. Hartley and called wife back.  He wants to repeat the PSA in one month and have the pt stay off the bushhog until after PSA done.  I explained he does not want the biopsy done yet.  Wife verbalized understanding.

## 2019-08-21 ENCOUNTER — TELEPHONE (OUTPATIENT)
Dept: RADIATION ONCOLOGY | Facility: HOSPITAL | Age: 69
End: 2019-08-21

## 2019-08-21 NOTE — TELEPHONE ENCOUNTER
Pt's wife left message stating she wants  to get PSA and be seen by Dr. Hartley on 9/3/19.  Called her back and explained Dr. Hartley has been on vacation and our schedule is full.    I gave her an appt for 9/9/19 @ 5627.  Jazlyn verbalized understanding.

## 2019-09-09 ENCOUNTER — HOSPITAL ENCOUNTER (OUTPATIENT)
Dept: RADIATION ONCOLOGY | Facility: HOSPITAL | Age: 69
Setting detail: RADIATION/ONCOLOGY SERIES
Discharge: HOME | End: 2019-09-09

## 2019-09-09 ENCOUNTER — OFFICE VISIT (OUTPATIENT)
Dept: RADIATION ONCOLOGY | Facility: HOSPITAL | Age: 69
End: 2019-09-09

## 2019-09-09 VITALS
DIASTOLIC BLOOD PRESSURE: 75 MMHG | HEART RATE: 64 BPM | RESPIRATION RATE: 20 BRPM | WEIGHT: 205.7 LBS | SYSTOLIC BLOOD PRESSURE: 135 MMHG | OXYGEN SATURATION: 98 % | TEMPERATURE: 98.3 F | BODY MASS INDEX: 31.28 KG/M2

## 2019-09-09 DIAGNOSIS — C61 PROSTATE CANCER (HCC): Primary | ICD-10-CM

## 2019-09-09 PROCEDURE — G0463 HOSPITAL OUTPT CLINIC VISIT: HCPCS

## 2019-09-09 NOTE — PROGRESS NOTES
FOLLOW UP NOTE    PATIENT:                                                      Yosvany Ojeda  MEDICAL RECORD #:                        [id number]  :                                                          1950  COMPLETION DATE:   19  DIAGNOSIS:     Prostate cancer (CMS/[facility])  Staging form: Prostate, AJCC 8th Edition  - Clinical stage from 10/1/2018: Stage IIB (cT1c, cN0, cM0, PSA: 7.7, Grade Group: 2) - Signed by Atul Hartley MD on 11/15/2018      BRIEF HISTORY:    He has a history of intermediate risk prostate cancer treated with CyberKnife SBRT.  He has persistent urinary symptoms of hesitancy but outlet irritative difficulties have improved since he is been avoiding use of heavy equipment operation.  He is no longer taking Flomax.  PSA initially increased to a value 9.64 ng/ml, however, PSA drawn today is slightly lower with value 9.47 ng/ml.  He has no new aches or pains but does suffer from severe arthritis and joint pains.  Bowels are fairly normal.    He has not been successful smoking cessation.    MEDICATIONS: Medication reconciliation for the patient was reviewed and confirmed in the electronic medical record.    Review of Systems   Constitutional: Positive for fatigue.   HENT:   Positive for hearing loss.    Eyes: Negative.    Respiratory: Negative.    Cardiovascular: Negative.    Gastrointestinal: Negative.    Endocrine: Negative.    Genitourinary: Negative.     Musculoskeletal: Negative.    Skin: Negative.    Neurological: Negative.    Hematological: Negative.    Psychiatric/Behavioral: Negative.          IPSS Questionnaire (AUA-7):  Over the past month…     1)  Incomplete Emptying  How often have you had a sensation of not emptying your bladder?  2 - Less than half the time   2)  Frequency  How often have you had to urinate less than every two hours? 2 - Less than half the time   3)  Intermittency  How often have you found you stopped and started again several times when you  urinated?  2 - Less than half the time   4) Urgency  How often have you found it difficult to postpone urination?  2 - Less than half the time   5) Weak Stream  How often have you had a weak urinary stream?  3 - About half the time   6) Straining  How often have you had to push or strain to begin urination?  3 - About half the time   7) Nocturia  How many times did you typically get up at night to urinate?  2 - 2 times   Total Score:  16         Quality of life due to urinary symptoms:  If you were to spend the rest of your life with your urinary condition the way it is now, how would you feel about that? 3-Mixed   Urine Leakage (Incontinence) 0-No Leakage      Sexual Health Inventory  Current Status     1)  How do you rate your confidence that you could achieve and keep an erection? 1-Very Low   2) When you had erections with sexual stimulation, how often were your erections hard enough for penetration (entering your partner)? 0-No sexual activity   3)  During sexual intercourse, how often were you able to maintain your erection after you had penetrated (entered) into your partner? 0-Did not attempt intercourse   4) During sexual intercourse, how difficult was it to maintain your erection to completion of intercourse? 0-Did not attempt intercourse   5) When you attempted sexual intercourse, how often was it satisfactory to you? 0-No sexual activity   Total Score: 1         Bowel Health Inventory  Current Status: 0-No problems, no rectal bleeding, no discharge, less then 5 bowel movements a day                  KPS 80%    Physical Exam   Constitutional: He is oriented to person, place, and time. He appears well-developed and well-nourished.   HENT:   Head: Normocephalic and atraumatic.   Neck: Normal range of motion. Neck supple.   Cardiovascular: Normal rate, regular rhythm and normal heart sounds.   No murmur heard.  Pulmonary/Chest: Effort normal and breath sounds normal. He has no wheezes. He has no rales.    Abdominal: Soft. Bowel sounds are normal. He exhibits no distension. There is no hepatosplenomegaly. There is no tenderness.   Genitourinary: Rectal exam shows no external hemorrhoid, no internal hemorrhoid, no fissure, no mass, no tenderness and anal tone normal. Prostate is not enlarged ( 20 cc, round, smooth, soft, no nodules or induration.  Seminal vesicles not palpable.) and not tender.   Musculoskeletal: Normal range of motion. He exhibits deformity. He exhibits no edema or tenderness.   Lymphadenopathy:     He has no cervical adenopathy.     He has no axillary adenopathy.        Right: No supraclavicular adenopathy present.        Left: No supraclavicular adenopathy present.   Neurological: He is alert and oriented to person, place, and time. He has normal strength. No sensory deficit.   Skin: Skin is warm and dry.   Psychiatric: He has a normal mood and affect. His behavior is normal. Judgment and thought content normal.   Nursing note and vitals reviewed.      VITAL SIGNS:   Vitals:    09/09/19 1421   BP: 135/75   Pulse: 64   Resp: 20   Temp: 98.3 °F (36.8 °C)   TempSrc: Temporal   SpO2: 98%   Weight: 93.3 kg (205 lb 11.2 oz)   PainSc: 0-No pain       The following portions of the patient's history were reviewed and updated as appropriate: allergies, current medications, past family history, past medical history, past social history, past surgical history and problem list.         Yosvany was seen today for prostate cancer.    Diagnoses and all orders for this visit:    Prostate cancer (CMS/[facility])         IMPRESSION:  Prostate cancer, Sherwood score 3+4 = 7, clinical stage IIB (T1c, N0, M0) with PSA 7.71 ng/ml.  He is only 6 months status post stereotactic body radiotherapy as definitive treatment for intermediate risk prostate cancer.  He is clinically doing well, but has had a PSA bounce.  He does still have urinary urgency and contributing factor of heavy machinery use.  Prostate has significantly reduced  in size, approximately half of pretreatment clinical volume on ROSA.  He has no suspicious areas on exam.  He has also continued to smoke cigarettes and has a history of EtOH use.    RECOMMENDATIONS: I would recommend continued surveillance with short interval repeat PSA in about 3 months.  If PSA starts coming down, he can be followed conservatively; however, if PSA rises significantly or does not eventually reach jasen, he can be considered for repeat imaging studies and possibly rebiopsy.    Smoking cessation was again strongly encouraged.    Return in about 3 months (around 12/9/2019) for Office Visit.    Atul Hartley MD    Errors in dictation may reflect use of voice recognition software and not all errors in transcription may have been detected prior to signing.

## 2019-12-09 ENCOUNTER — HOSPITAL ENCOUNTER (OUTPATIENT)
Dept: RADIATION ONCOLOGY | Facility: HOSPITAL | Age: 69
Setting detail: RADIATION/ONCOLOGY SERIES
Discharge: HOME | End: 2019-12-09

## 2019-12-09 ENCOUNTER — OFFICE VISIT (OUTPATIENT)
Dept: RADIATION ONCOLOGY | Facility: HOSPITAL | Age: 69
End: 2019-12-09

## 2019-12-09 VITALS
HEART RATE: 56 BPM | RESPIRATION RATE: 16 BRPM | BODY MASS INDEX: 31.11 KG/M2 | SYSTOLIC BLOOD PRESSURE: 123 MMHG | HEIGHT: 67 IN | OXYGEN SATURATION: 96 % | TEMPERATURE: 97.6 F | DIASTOLIC BLOOD PRESSURE: 67 MMHG | WEIGHT: 198.2 LBS

## 2019-12-09 DIAGNOSIS — C61 RECURRENT PROSTATE CANCER (HCC): Primary | ICD-10-CM

## 2019-12-09 NOTE — PROGRESS NOTES
FOLLOW UP NOTE    PATIENT:                                                      Yosvany Ojeda  MEDICAL RECORD #:                        [id number]  :                                                          1950  COMPLETION DATE:   19  DIAGNOSIS:     Prostate cancer (CMS/[facility])  - Stage IIB (cT1c, cN0, cM0, PSA: 7.7, Grade Group: 2)      BRIEF HISTORY:    He has a history of intermediate risk prostate cancer treated with stereotactic body radiotherapy.  PSA has failed to decline towards jasen values.  Most recent PSA actually increased from 9.47 ng/ml on 2019 to a value 16.34 ng/ml today on 2019.  He has had mild urinary symptoms of urgency and frequency with nocturia x2-3 but no dysuria.  No hematuria.  No bowel symptoms.  No pain.    MEDICATIONS: Medication reconciliation for the patient was reviewed and confirmed in the electronic medical record.    Review of Systems   Constitutional: Negative.    HENT:   Positive for hearing loss.    Eyes: Negative.    Respiratory: Negative.    Cardiovascular: Negative.    Gastrointestinal: Negative.    Endocrine: Negative.    Genitourinary: Negative.     Musculoskeletal: Positive for arthralgias.   Skin: Negative.    Neurological: Negative.    Hematological: Negative.    Psychiatric/Behavioral: Negative.          IPSS Questionnaire (AUA-7):  Over the past month…     1)  Incomplete Emptying  How often have you had a sensation of not emptying your bladder?  2 - Less than half the time   2)  Frequency  How often have you had to urinate less than every two hours? 2 - Less than half the time   3)  Intermittency  How often have you found you stopped and started again several times when you urinated?  2 - Less than half the time   4) Urgency  How often have you found it difficult to postpone urination?  2 - Less than half the time   5) Weak Stream  How often have you had a weak urinary stream?  3 - About half the time   6) Straining  How often have you had to  "push or strain to begin urination?  3 - About half the time   7) Nocturia  How many times did you typically get up at night to urinate?  2 - 2 times   Total Score:  16         Quality of life due to urinary symptoms:  If you were to spend the rest of your life with your urinary condition the way it is now, how would you feel about that? 3-Mixed   Urine Leakage (Incontinence) 0-No Leakage      Sexual Health Inventory  Current Status     1)  How do you rate your confidence that you could achieve and keep an erection? 1-Very Low   2) When you had erections with sexual stimulation, how often were your erections hard enough for penetration (entering your partner)? 0-No sexual activity   3)  During sexual intercourse, how often were you able to maintain your erection after you had penetrated (entered) into your partner? 0-Did not attempt intercourse   4) During sexual intercourse, how difficult was it to maintain your erection to completion of intercourse? 0-Did not attempt intercourse   5) When you attempted sexual intercourse, how often was it satisfactory to you? 0-No sexual activity   Total Score: 1         Bowel Health Inventory  Current Status: 0-No problems, no rectal bleeding, no discharge, less then 5 bowel movements a day             KPS 80%    Physical Exam    VITAL SIGNS:   Vitals:    12/09/19 1500   BP: 123/67   Pulse: 56   Resp: 16   Temp: 97.6 °F (36.4 °C)   TempSrc: Temporal   SpO2: 96%  Comment: RA   Weight: 89.9 kg (198 lb 3.2 oz)   Height: 170.2 cm (67\")   PainSc: 0-No pain       The following portions of the patient's history were reviewed and updated as appropriate: allergies, current medications, past family history, past medical history, past social history, past surgical history and problem list.       IMPRESSION:  Prostate cancer, Benigno score 3+4 = 7, clinical stage IIB (T1c, N0, M0) with PSA 7.71 ng/ml.  9 months status post CyberKnife SBRT.  PSA has increased significantly, to a value of 16.34 " ng/ml.  This is not expected or typical for his disease risk and appears more pronounced than expected for a benign bounce.  I suspect a high likelihood he has a occult metastatic disease.    RECOMMENDATIONS: I will order an Axumin PET/CT to evaluate disease status for biochemical failure following definitive treatment with SBRT.  I have discussed with Dr. Woo who is prepared to perform prostate biopsy, if needed, if the Axumen scan fails to identify the source of his PSA production.  Patient will likely initiate androgen suppression if he has evidence of metastatic/recurrent prostate cancer.  Smoking cessation was again discussed but declined.           Atul Hartley MD    Errors in dictation may reflect use of voice recognition software and not all errors in transcription may have been detected prior to signing.

## 2019-12-31 ENCOUNTER — HOSPITAL ENCOUNTER (OUTPATIENT)
Dept: PET IMAGING | Facility: HOSPITAL | Age: 69
Discharge: HOME | End: 2019-12-31
Admitting: RADIOLOGY

## 2019-12-31 DIAGNOSIS — C61 RECURRENT PROSTATE CANCER (HCC): ICD-10-CM

## 2019-12-31 PROCEDURE — A9588 FLUCICLOVINE F-18: HCPCS | Performed by: RADIOLOGY

## 2019-12-31 PROCEDURE — 78815 PET IMAGE W/CT SKULL-THIGH: CPT

## 2019-12-31 PROCEDURE — 0 FLUCICLOVINE: Performed by: RADIOLOGY

## 2019-12-31 RX ADMIN — FLUCICLOVINE F-18 1 DOSE: 221 INJECTION, SOLUTION INTRAVENOUS at 13:38

## 2020-01-07 ENCOUNTER — HOSPITAL ENCOUNTER (OUTPATIENT)
Dept: RADIATION ONCOLOGY | Facility: HOSPITAL | Age: 70
Setting detail: RADIATION/ONCOLOGY SERIES
Discharge: HOME | End: 2020-01-07

## 2020-01-07 ENCOUNTER — OFFICE VISIT (OUTPATIENT)
Dept: RADIATION ONCOLOGY | Facility: HOSPITAL | Age: 70
End: 2020-01-07

## 2020-01-07 VITALS
TEMPERATURE: 97.9 F | BODY MASS INDEX: 31.54 KG/M2 | HEART RATE: 61 BPM | SYSTOLIC BLOOD PRESSURE: 157 MMHG | WEIGHT: 201.4 LBS | DIASTOLIC BLOOD PRESSURE: 75 MMHG | OXYGEN SATURATION: 98 % | RESPIRATION RATE: 20 BRPM

## 2020-01-07 DIAGNOSIS — C61 PROSTATE CANCER (HCC): Primary | ICD-10-CM

## 2020-01-07 PROCEDURE — G0463 HOSPITAL OUTPT CLINIC VISIT: HCPCS

## 2020-01-07 NOTE — PROGRESS NOTES
FOLLOW UP NOTE    PATIENT:                                                      Yosvany Ojeda  MEDICAL RECORD #:                        [id number]  :                                                          1950  COMPLETION DATE:   19  DIAGNOSIS:     Prostate cancer (CMS/[facility])  - Stage IIB (cT1c, cN0, cM0, PSA: 7.7, Grade Group: 2)      BRIEF HISTORY:    He has a history of intermediate risk prostate cancer treated with stereotactic body radiotherapy.  PSA has failed to decline towards jasen values, increasing recently to maximum value 16.34 ng/ml on 2019.  Axumin PET/CT shows diffuse uptake throughout the residual prostate gland of uncertain significance in this situation; however, there is pathologic enlargement and hypermetabolism in multiple lymph nodes extending from the right iliac region up through the lower and mid periaortic regions.  These are non-bulky lymph nodes measuring up to 1.4 cm diameter.  There is no evidence of organ involvement.  There are more typical than normal small bone islands which are not hypermetabolic.  No definite evidence of bone metastasis.    MEDICATIONS: Medication reconciliation for the patient was reviewed and confirmed in the electronic medical record.    Review of Systems   Constitutional: Negative.    HENT:   Positive for hearing loss.    Eyes: Negative.    Respiratory: Negative.    Cardiovascular: Negative.    Gastrointestinal: Negative.    Endocrine: Negative.    Genitourinary: Negative.     Musculoskeletal: Positive for arthralgias.   Skin: Negative.    Neurological: Negative.    Hematological: Negative.    Psychiatric/Behavioral: Negative.          IPSS Questionnaire (AUA-7):  Over the past month…     1)  Incomplete Emptying  How often have you had a sensation of not emptying your bladder?  2 - Less than half the time   2)  Frequency  How often have you had to urinate less than every two hours? 2 - Less than half the time   3)  Intermittency  How  often have you found you stopped and started again several times when you urinated?  2 - Less than half the time   4) Urgency  How often have you found it difficult to postpone urination?  2 - Less than half the time   5) Weak Stream  How often have you had a weak urinary stream?  3 - About half the time   6) Straining  How often have you had to push or strain to begin urination?  3 - About half the time   7) Nocturia  How many times did you typically get up at night to urinate?  2 - 2 times   Total Score:  16         Quality of life due to urinary symptoms:  If you were to spend the rest of your life with your urinary condition the way it is now, how would you feel about that? 3-Mixed   Urine Leakage (Incontinence) 0-No Leakage      Sexual Health Inventory  Current Status     1)  How do you rate your confidence that you could achieve and keep an erection? 1-Very Low   2) When you had erections with sexual stimulation, how often were your erections hard enough for penetration (entering your partner)? 0-No sexual activity   3)  During sexual intercourse, how often were you able to maintain your erection after you had penetrated (entered) into your partner? 0-Did not attempt intercourse   4) During sexual intercourse, how difficult was it to maintain your erection to completion of intercourse? 0-Did not attempt intercourse   5) When you attempted sexual intercourse, how often was it satisfactory to you? 0-No sexual activity   Total Score: 1         Bowel Health Inventory  Current Status: 0-No problems, no rectal bleeding, no discharge, less then 5 bowel movements a day                   KPS 80%    Physical Exam   Constitutional: He is oriented to person, place, and time. He appears well-developed and well-nourished.   HENT:   Head: Normocephalic and atraumatic.   Neck: Normal range of motion. Neck supple.   Cardiovascular: Normal rate and regular rhythm.   Pulmonary/Chest: Effort normal.   Abdominal: Soft.    Musculoskeletal: Normal range of motion.   Neurological: He is alert and oriented to person, place, and time.   Skin: Skin is warm and dry.   Psychiatric: He has a normal mood and affect. His behavior is normal. Judgment and thought content normal.   Nursing note and vitals reviewed.      VITAL SIGNS:   Vitals:    01/07/20 1343   BP: 157/75   Pulse: 61   Resp: 20   Temp: 97.9 °F (36.6 °C)   TempSrc: Temporal   SpO2: 98%   Weight: 91.4 kg (201 lb 6.4 oz)   PainSc: 0-No pain       The following portions of the patient's history were reviewed and updated as appropriate: allergies, current medications, past family history, past medical history, past social history, past surgical history and problem list.         Yosvany was seen today for prostate cancer.    Diagnoses and all orders for this visit:    Prostate cancer (CMS/[facility])         IMPRESSION:  Prostate cancer, Benigno score 3+4 = 7, clinical stage IIB (T1c, N0, M0) with pretreatment PSA 7.71 ng/ml.  He is 10 months status post CyberKnife SBRT with good initial response as well as downsizing of the prostate gland with reduction in urinary outflow obstructive symptoms.  Unfortunately, he now has rising PSA and radiographic evidence on Axumin PET/CT of right iliac and periaortic metastasis.  His disease still limited in extent, involving lymphatics only.  Androgen ablation/systemic management would be most appropriate at this time.  I do not believe rebiopsy of the prostate gland would add any additional information to help with treatment decisions at this point.    RECOMMENDATIONS: He will return to Dr. Woo soon for initiation of androgen suppression therapies.  He currently has an appointment with Dr. Woo on January 17, 2020 at 10 AM, but is interested in moving that appointment up, if possible.  Since his PSA had been increasing fairly rapidly, it may be prudent to draw another PSA just prior to LHRH agonist administration to know his starting point PSA  before systemic treatment.  We discussed referral for consultation with medical oncologist regarding pros and cons of adding supplemental systemic treatments now versus in the future if hormone therapy fails to control disease.      Return in about 3 months (around 4/7/2020) for Office Visit.    Atul Hartley MD     Addendum 1/10/2020:  Dr. Woo will be seeing patient on Monday, 1/13/2020 for reevaluation and initiation of androgen ablation.  He will not be pursuing prostate biopsy under the circumstances.  He will arrange for medical oncology consultation.    Errors in dictation may reflect use of voice recognition software and not all errors in transcription may have been detected prior to signing.

## 2020-02-04 RX ORDER — SILDENAFIL CITRATE 20 MG/1
20 TABLET ORAL AS NEEDED
Qty: 30 TABLET | Refills: 11 | Status: SHIPPED | OUTPATIENT
Start: 2020-02-04

## 2020-04-13 ENCOUNTER — TELEPHONE (OUTPATIENT)
Dept: RADIATION ONCOLOGY | Facility: HOSPITAL | Age: 70
End: 2020-04-13

## 2020-04-13 NOTE — TELEPHONE ENCOUNTER
Left message with pt's wife that f/u appt with  has been cancelled. Informed her we would contact her to reschedule appt for 2months.   Wife verbalized understanding.

## 2020-04-15 ENCOUNTER — HOSPITAL ENCOUNTER (OUTPATIENT)
Dept: RADIATION ONCOLOGY | Facility: HOSPITAL | Age: 70
Setting detail: RADIATION/ONCOLOGY SERIES
Discharge: HOME | End: 2020-04-15

## 2020-06-17 ENCOUNTER — HOSPITAL ENCOUNTER (OUTPATIENT)
Dept: RADIATION ONCOLOGY | Facility: HOSPITAL | Age: 70
Setting detail: RADIATION/ONCOLOGY SERIES
Discharge: HOME | End: 2020-06-17

## 2020-06-17 ENCOUNTER — TELEPHONE (OUTPATIENT)
Dept: RADIATION ONCOLOGY | Facility: HOSPITAL | Age: 70
End: 2020-06-17

## 2020-06-17 NOTE — TELEPHONE ENCOUNTER
Patient was called, as he did not show for his follow-up appointment today.  I spoke with his wife, Jazlyn.  They were aware of follow-up, though have transferred care to Dr. Stallings with  radiation oncology.  Mr. Ojeda is receiving LHRH agonist there, with most recent PSA 4/15/2020 of 1.03 NG/mL.  Jazlyn states he is doing well.  They are pleased with the care he is receiving, and would like to continue care under Dr. Stallings.  She thanked Dr. Hartley and staff and voiced no concerns or complaints.  We will follow-up as needed.

## (undated) DEVICE — TRY L/P SFTY A/20G